# Patient Record
Sex: FEMALE | Race: OTHER | Employment: FULL TIME | URBAN - METROPOLITAN AREA
[De-identification: names, ages, dates, MRNs, and addresses within clinical notes are randomized per-mention and may not be internally consistent; named-entity substitution may affect disease eponyms.]

---

## 2021-10-13 ENCOUNTER — HOSPITAL ENCOUNTER (OUTPATIENT)
Age: 51
Discharge: NURSING, REHAB OR LONG-TERM CARE | End: 2021-10-13
Payer: COMMERCIAL

## 2021-10-13 VITALS
HEART RATE: 82 BPM | SYSTOLIC BLOOD PRESSURE: 137 MMHG | OXYGEN SATURATION: 99 % | TEMPERATURE: 99 F | RESPIRATION RATE: 20 BRPM | DIASTOLIC BLOOD PRESSURE: 75 MMHG

## 2021-10-13 DIAGNOSIS — R06.00 DYSPNEA, UNSPECIFIED TYPE: Primary | ICD-10-CM

## 2021-10-13 DIAGNOSIS — R04.2 HEMOPTYSIS: ICD-10-CM

## 2021-10-13 PROCEDURE — 99203 OFFICE O/P NEW LOW 30 MIN: CPT | Performed by: NURSE PRACTITIONER

## 2021-10-13 NOTE — ED INITIAL ASSESSMENT (HPI)
Pt here to IC with c/o 4 days with pain on right side of back with sob. No fevers, slight cough. resp easy and regular.

## 2021-10-13 NOTE — ED PROVIDER NOTES
No chief complaint on file. HPI:     David Vogt is a 45year old female who presents for shortness of breath and right-sided upper back pain that started a couple days ago.   She states she has a mild dry cough, but the shortness of breath is worse Exercise per Session: Not on file  Stress:       Feeling of Stress : Not on file  Social Connections:       Frequency of Communication with Friends and Family: Not on file      Frequency of Social Gatherings with Friends and Family: Not on file      Attend done that we are capable of doing here in the immediate care. She agrees with the plan of care. Her sister will drive her to the emergency department at [place].  She declines EMS transfer. I discussed this patient with Dr. Stephen Alexander by phone.  He agr

## 2021-10-28 ENCOUNTER — LAB ENCOUNTER (OUTPATIENT)
Dept: LAB | Facility: HOSPITAL | Age: 51
End: 2021-10-28
Attending: INTERNAL MEDICINE
Payer: COMMERCIAL

## 2021-10-28 ENCOUNTER — HOSPITAL ENCOUNTER (OUTPATIENT)
Dept: GENERAL RADIOLOGY | Facility: HOSPITAL | Age: 51
Discharge: HOME | End: 2021-10-28
Attending: INTERNAL MEDICINE
Payer: COMMERCIAL

## 2021-10-28 ENCOUNTER — OFFICE VISIT (OUTPATIENT)
Dept: FAMILY MEDICINE CLINIC | Facility: CLINIC | Age: 51
End: 2021-10-28
Payer: COMMERCIAL

## 2021-10-28 VITALS
BODY MASS INDEX: 30.16 KG/M2 | HEART RATE: 84 BPM | HEIGHT: 63 IN | RESPIRATION RATE: 18 BRPM | WEIGHT: 170.19 LBS | SYSTOLIC BLOOD PRESSURE: 120 MMHG | OXYGEN SATURATION: 93 % | DIASTOLIC BLOOD PRESSURE: 70 MMHG

## 2021-10-28 DIAGNOSIS — M54.6 CHRONIC RIGHT-SIDED THORACIC BACK PAIN: ICD-10-CM

## 2021-10-28 DIAGNOSIS — Z11.59 NEED FOR HEPATITIS C SCREENING TEST: ICD-10-CM

## 2021-10-28 DIAGNOSIS — E03.9 HYPOTHYROIDISM, UNSPECIFIED TYPE: ICD-10-CM

## 2021-10-28 DIAGNOSIS — Z00.00 PREVENTATIVE HEALTH CARE: ICD-10-CM

## 2021-10-28 DIAGNOSIS — Z12.11 ENCOUNTER FOR SCREENING FECAL OCCULT BLOOD TESTING: ICD-10-CM

## 2021-10-28 DIAGNOSIS — R07.89 CHEST DISCOMFORT: Primary | ICD-10-CM

## 2021-10-28 DIAGNOSIS — R04.2 HEMOPTYSIS: ICD-10-CM

## 2021-10-28 DIAGNOSIS — G89.29 CHRONIC RIGHT-SIDED THORACIC BACK PAIN: ICD-10-CM

## 2021-10-28 DIAGNOSIS — Z12.31 SCREENING MAMMOGRAM FOR BREAST CANCER: ICD-10-CM

## 2021-10-28 DIAGNOSIS — R07.89 CHEST DISCOMFORT: ICD-10-CM

## 2021-10-28 PROCEDURE — 3008F BODY MASS INDEX DOCD: CPT | Performed by: INTERNAL MEDICINE

## 2021-10-28 PROCEDURE — 71046 X-RAY EXAM CHEST 2 VIEWS: CPT | Performed by: INTERNAL MEDICINE

## 2021-10-28 PROCEDURE — 86803 HEPATITIS C AB TEST: CPT

## 2021-10-28 PROCEDURE — 3074F SYST BP LT 130 MM HG: CPT | Performed by: INTERNAL MEDICINE

## 2021-10-28 PROCEDURE — 72072 X-RAY EXAM THORAC SPINE 3VWS: CPT | Performed by: INTERNAL MEDICINE

## 2021-10-28 PROCEDURE — 99205 OFFICE O/P NEW HI 60 MIN: CPT | Performed by: INTERNAL MEDICINE

## 2021-10-28 PROCEDURE — 36415 COLL VENOUS BLD VENIPUNCTURE: CPT

## 2021-10-28 PROCEDURE — 80053 COMPREHEN METABOLIC PANEL: CPT

## 2021-10-28 PROCEDURE — 85025 COMPLETE CBC W/AUTO DIFF WBC: CPT | Performed by: INTERNAL MEDICINE

## 2021-10-28 PROCEDURE — 3078F DIAST BP <80 MM HG: CPT | Performed by: INTERNAL MEDICINE

## 2021-10-28 PROCEDURE — 84443 ASSAY THYROID STIM HORMONE: CPT

## 2021-10-31 NOTE — PROGRESS NOTES
[address]  New Patient History and Physical      HPI:   Patient presents with:  [address] F/U      Mariluz Carvalho is a 45year old female presenting for:  Chest discomfort and cough.   Has  has no past medical h found for: A1C     Lipid panel  No results found for: CHOLEST, HDL, TRIG, LDL, NONHDLC  The ASCVD Risk score (Ramy Jacobo., et al., 2013) failed to calculate for the following reasons:    Cannot find a previous HDL lab    Cannot find a previous total choles nervous/anxious.              PHYSICAL EXAM:   /70 (BP Location: Right arm, Patient Position: Sitting, Cuff Size: adult)   Pulse 84   Resp 18   Ht 5' 3\" (1.6 m)   Wt 170 lb 3.2 oz (77.2 kg)   SpO2 93%   BMI 30.15 kg/m²  Estimated body mass index is 3 normal.         Judgment: Judgment normal.             ASSESSMENT AND PLAN:   Patient is a 45year old female who presents primarily presents for:    (R07.89) Chest discomfort  (primary encounter diagnosis)  Plan: XR CHEST PA + LAT CHEST (CPT=71046)      ( above recommendations and agrees to the above plan. Reasurrance and education provided. All questions answered.   Notified to call with any questions, complications, allergies, or worsening or changing symptoms as well as any side effects or complications

## 2021-11-09 ENCOUNTER — TELEPHONE (OUTPATIENT)
Dept: FAMILY MEDICINE CLINIC | Facility: CLINIC | Age: 51
End: 2021-11-09

## 2021-11-09 NOTE — TELEPHONE ENCOUNTER
Patient notified of her results and expressed understanding of them.      ----- Message from Su Sidhu MD sent at 11/8/2021  3:31 PM CST -----  Please inform her that her labs were reviewed. No sign abnormality.   Her chest xray was normal.  Emilee

## 2022-05-13 ENCOUNTER — TELEPHONE (OUTPATIENT)
Dept: INTERNAL MEDICINE CLINIC | Facility: CLINIC | Age: 52
End: 2022-05-13

## 2023-04-07 ENCOUNTER — TELEPHONE (OUTPATIENT)
Dept: INTERNAL MEDICINE CLINIC | Facility: CLINIC | Age: 53
End: 2023-04-07

## 2023-04-07 NOTE — TELEPHONE ENCOUNTER
2nd attempt to have patient make an appt. Care gaps include mammogram, pap, colonoscopy. Letter mailed out to have  Her make appt.

## 2023-10-05 ENCOUNTER — PATIENT OUTREACH (OUTPATIENT)
Dept: CASE MANAGEMENT | Age: 53
End: 2023-10-05

## 2023-10-05 NOTE — PROCEDURES
The office order for PCP removal request is Approved and finalized on October 5, 2023.     Thanks,  [facility] [employer]

## (undated) NOTE — LETTER
4/7/2023  [address] [place] [address] Drive    We take each of our patient's health very seriously and the key to maintaining your health is an annual wellness physical.  Review of your medical records shows that it is time for your annual wellness exam.  Please contact my office at your earliest convenience to schedule this appointment at [phone].   Thank Corrine Vail MD